# Patient Record
Sex: FEMALE | Race: WHITE | NOT HISPANIC OR LATINO | URBAN - METROPOLITAN AREA
[De-identification: names, ages, dates, MRNs, and addresses within clinical notes are randomized per-mention and may not be internally consistent; named-entity substitution may affect disease eponyms.]

---

## 2017-09-26 ENCOUNTER — OUTPATIENT (OUTPATIENT)
Dept: OUTPATIENT SERVICES | Facility: HOSPITAL | Age: 82
LOS: 1 days | Discharge: HOME | End: 2017-09-26

## 2017-09-29 DIAGNOSIS — M19.90 UNSPECIFIED OSTEOARTHRITIS, UNSPECIFIED SITE: ICD-10-CM

## 2017-09-29 DIAGNOSIS — E78.5 HYPERLIPIDEMIA, UNSPECIFIED: ICD-10-CM

## 2017-09-29 DIAGNOSIS — I10 ESSENTIAL (PRIMARY) HYPERTENSION: ICD-10-CM

## 2017-09-29 DIAGNOSIS — Z79.82 LONG TERM (CURRENT) USE OF ASPIRIN: ICD-10-CM

## 2017-09-29 DIAGNOSIS — H25.11 AGE-RELATED NUCLEAR CATARACT, RIGHT EYE: ICD-10-CM

## 2017-09-29 DIAGNOSIS — Z86.73 PERSONAL HISTORY OF TRANSIENT ISCHEMIC ATTACK (TIA), AND CEREBRAL INFARCTION WITHOUT RESIDUAL DEFICITS: ICD-10-CM

## 2017-10-10 ENCOUNTER — OUTPATIENT (OUTPATIENT)
Dept: OUTPATIENT SERVICES | Facility: HOSPITAL | Age: 82
LOS: 1 days | Discharge: HOME | End: 2017-10-10

## 2017-10-12 DIAGNOSIS — Z86.73 PERSONAL HISTORY OF TRANSIENT ISCHEMIC ATTACK (TIA), AND CEREBRAL INFARCTION WITHOUT RESIDUAL DEFICITS: ICD-10-CM

## 2017-10-12 DIAGNOSIS — H25.12 AGE-RELATED NUCLEAR CATARACT, LEFT EYE: ICD-10-CM

## 2017-10-12 DIAGNOSIS — Z79.82 LONG TERM (CURRENT) USE OF ASPIRIN: ICD-10-CM

## 2017-10-12 DIAGNOSIS — E78.5 HYPERLIPIDEMIA, UNSPECIFIED: ICD-10-CM

## 2017-10-12 DIAGNOSIS — I10 ESSENTIAL (PRIMARY) HYPERTENSION: ICD-10-CM

## 2018-05-17 ENCOUNTER — APPOINTMENT (OUTPATIENT)
Dept: ORTHOPEDIC SURGERY | Facility: CLINIC | Age: 83
End: 2018-05-17
Payer: MEDICARE

## 2018-05-17 VITALS
HEART RATE: 41 BPM | HEIGHT: 62 IN | SYSTOLIC BLOOD PRESSURE: 176 MMHG | DIASTOLIC BLOOD PRESSURE: 74 MMHG | BODY MASS INDEX: 19.69 KG/M2 | WEIGHT: 107 LBS

## 2018-05-17 VITALS — BODY MASS INDEX: 19.69 KG/M2 | WEIGHT: 107 LBS | HEIGHT: 62 IN

## 2018-05-17 DIAGNOSIS — Z87.39 PERSONAL HISTORY OF OTHER DISEASES OF THE MUSCULOSKELETAL SYSTEM AND CONNECTIVE TISSUE: ICD-10-CM

## 2018-05-17 DIAGNOSIS — Z78.9 OTHER SPECIFIED HEALTH STATUS: ICD-10-CM

## 2018-05-17 DIAGNOSIS — Z86.39 PERSONAL HISTORY OF OTHER ENDOCRINE, NUTRITIONAL AND METABOLIC DISEASE: ICD-10-CM

## 2018-05-17 DIAGNOSIS — Z86.79 PERSONAL HISTORY OF OTHER DISEASES OF THE CIRCULATORY SYSTEM: ICD-10-CM

## 2018-05-17 PROCEDURE — 26725 TREAT FINGER FRACTURE EACH: CPT | Mod: F9

## 2018-05-17 PROCEDURE — 73130 X-RAY EXAM OF HAND: CPT | Mod: RT

## 2018-05-17 PROCEDURE — 99203 OFFICE O/P NEW LOW 30 MIN: CPT | Mod: 57

## 2018-05-17 RX ORDER — OSELTAMIVIR PHOSPHATE 75 MG/1
75 CAPSULE ORAL
Qty: 10 | Refills: 0 | Status: DISCONTINUED | COMMUNITY
Start: 2017-11-28

## 2018-05-17 RX ORDER — PEN NEEDLE, DIABETIC 29 G X1/2"
32G X 4 MM NEEDLE, DISPOSABLE MISCELLANEOUS
Qty: 100 | Refills: 0 | Status: DISCONTINUED | COMMUNITY
Start: 2017-08-01

## 2018-05-17 RX ORDER — DILTIAZEM HYDROCHLORIDE 120 MG/1
120 CAPSULE, EXTENDED RELEASE ORAL
Qty: 90 | Refills: 0 | Status: ACTIVE | COMMUNITY
Start: 2017-10-12

## 2018-05-17 RX ORDER — TERIPARATIDE 250 UG/ML
600 INJECTION, SOLUTION SUBCUTANEOUS
Qty: 2 | Refills: 0 | Status: DISCONTINUED | COMMUNITY
Start: 2017-08-01

## 2018-05-17 RX ORDER — MULTIVITAMIN
TABLET ORAL
Refills: 0 | Status: DISCONTINUED | COMMUNITY

## 2018-05-17 RX ORDER — DIAZEPAM 10 MG/1
10 TABLET ORAL
Qty: 60 | Refills: 0 | Status: ACTIVE | COMMUNITY
Start: 2017-11-28

## 2018-05-17 RX ORDER — AMOXICILLIN 500 MG/1
500 CAPSULE ORAL
Qty: 36 | Refills: 0 | Status: DISCONTINUED | COMMUNITY
Start: 2017-12-04

## 2018-05-17 RX ORDER — PREDNISOLONE ACETATE 10 MG/ML
1 SUSPENSION/ DROPS OPHTHALMIC
Qty: 5 | Refills: 0 | Status: ACTIVE | COMMUNITY
Start: 2017-11-15

## 2018-05-17 RX ORDER — AZITHROMYCIN 250 MG/1
250 TABLET, FILM COATED ORAL
Qty: 6 | Refills: 0 | Status: DISCONTINUED | COMMUNITY
Start: 2017-11-02

## 2018-05-17 RX ORDER — CHROMIUM 200 MCG
TABLET ORAL
Refills: 0 | Status: ACTIVE | COMMUNITY

## 2018-05-19 PROBLEM — Z78.9 CURRENT NON-SMOKER: Status: ACTIVE | Noted: 2018-05-17

## 2018-05-19 PROBLEM — Z87.39 HISTORY OF OSTEOPOROSIS: Status: RESOLVED | Noted: 2018-05-17 | Resolved: 2018-05-19

## 2018-05-19 PROBLEM — Z86.39 HISTORY OF HIGH CHOLESTEROL: Status: RESOLVED | Noted: 2018-05-17 | Resolved: 2018-05-19

## 2018-05-19 PROBLEM — Z86.79 HISTORY OF HYPERTENSION: Status: RESOLVED | Noted: 2018-05-17 | Resolved: 2018-05-19

## 2018-05-21 ENCOUNTER — APPOINTMENT (OUTPATIENT)
Dept: ORTHOPEDIC SURGERY | Facility: CLINIC | Age: 83
End: 2018-05-21
Payer: MEDICARE

## 2018-05-21 PROCEDURE — 99024 POSTOP FOLLOW-UP VISIT: CPT

## 2018-06-12 ENCOUNTER — APPOINTMENT (OUTPATIENT)
Dept: ORTHOPEDIC SURGERY | Facility: CLINIC | Age: 83
End: 2018-06-12
Payer: MEDICARE

## 2018-06-12 DIAGNOSIS — S62.616D DISPLACED FRACTURE OF PROXIMAL PHALANX OF RIGHT LITTLE FINGER, SUBSEQUENT ENCOUNTER FOR FRACTURE WITH ROUTINE HEALING: ICD-10-CM

## 2018-06-12 PROCEDURE — 99024 POSTOP FOLLOW-UP VISIT: CPT

## 2018-06-12 PROCEDURE — 73130 X-RAY EXAM OF HAND: CPT | Mod: RT

## 2019-03-04 ENCOUNTER — TRANSCRIPTION ENCOUNTER (OUTPATIENT)
Age: 84
End: 2019-03-04

## 2024-11-19 ENCOUNTER — APPOINTMENT (OUTPATIENT)
Dept: ORTHOPEDIC SURGERY | Facility: CLINIC | Age: 89
End: 2024-11-19
Payer: MEDICARE

## 2024-11-19 VITALS — HEART RATE: 84 BPM | HEIGHT: 62 IN

## 2024-11-19 DIAGNOSIS — M19.031 PRIMARY OSTEOARTHRITIS, RIGHT WRIST: ICD-10-CM

## 2024-11-19 DIAGNOSIS — M19.041 PRIMARY OSTEOARTHRITIS, RIGHT HAND: ICD-10-CM

## 2024-11-19 DIAGNOSIS — M19.032 PRIMARY OSTEOARTHRITIS, LEFT WRIST: ICD-10-CM

## 2024-11-19 DIAGNOSIS — M65.341 TRIGGER FINGER, RIGHT RING FINGER: ICD-10-CM

## 2024-11-19 DIAGNOSIS — M19.042 PRIMARY OSTEOARTHRITIS, LEFT HAND: ICD-10-CM

## 2024-11-19 PROCEDURE — 99203 OFFICE O/P NEW LOW 30 MIN: CPT | Mod: 25

## 2024-11-19 PROCEDURE — 20550 NJX 1 TENDON SHEATH/LIGAMENT: CPT | Mod: RT

## 2024-11-19 PROCEDURE — 73110 X-RAY EXAM OF WRIST: CPT | Mod: 50

## 2024-11-20 PROBLEM — M19.041 PRIMARY OSTEOARTHRITIS OF RIGHT HAND: Status: ACTIVE | Noted: 2024-11-19

## 2024-11-20 PROBLEM — M19.031 PRIMARY OSTEOARTHRITIS OF RIGHT WRIST: Status: ACTIVE | Noted: 2024-11-20

## 2024-11-20 PROBLEM — M19.032 LOCALIZED PRIMARY OSTEOARTHRITIS OF LEFT WRIST: Status: ACTIVE | Noted: 2024-11-20

## 2025-03-31 ENCOUNTER — APPOINTMENT (OUTPATIENT)
Dept: NEPHROLOGY | Facility: CLINIC | Age: 89
End: 2025-03-31

## 2025-05-04 ENCOUNTER — INPATIENT (INPATIENT)
Facility: HOSPITAL | Age: 89
LOS: 0 days | Discharge: OTHER INSTITUTION | DRG: 310 | End: 2025-05-04
Attending: STUDENT IN AN ORGANIZED HEALTH CARE EDUCATION/TRAINING PROGRAM | Admitting: STUDENT IN AN ORGANIZED HEALTH CARE EDUCATION/TRAINING PROGRAM
Payer: MEDICARE

## 2025-05-04 VITALS
SYSTOLIC BLOOD PRESSURE: 92 MMHG | DIASTOLIC BLOOD PRESSURE: 69 MMHG | RESPIRATION RATE: 35 BRPM | HEART RATE: 137 BPM | TEMPERATURE: 98 F

## 2025-05-04 VITALS
TEMPERATURE: 98 F | DIASTOLIC BLOOD PRESSURE: 73 MMHG | HEART RATE: 68 BPM | SYSTOLIC BLOOD PRESSURE: 184 MMHG | OXYGEN SATURATION: 96 % | RESPIRATION RATE: 20 BRPM

## 2025-05-04 DIAGNOSIS — Z96.643 PRESENCE OF ARTIFICIAL HIP JOINT, BILATERAL: Chronic | ICD-10-CM

## 2025-05-04 DIAGNOSIS — R91.8 OTHER NONSPECIFIC ABNORMAL FINDING OF LUNG FIELD: ICD-10-CM

## 2025-05-04 DIAGNOSIS — I48.91 UNSPECIFIED ATRIAL FIBRILLATION: ICD-10-CM

## 2025-05-04 DIAGNOSIS — I42.1 OBSTRUCTIVE HYPERTROPHIC CARDIOMYOPATHY: ICD-10-CM

## 2025-05-04 DIAGNOSIS — E87.1 HYPO-OSMOLALITY AND HYPONATREMIA: ICD-10-CM

## 2025-05-04 DIAGNOSIS — Z29.9 ENCOUNTER FOR PROPHYLACTIC MEASURES, UNSPECIFIED: ICD-10-CM

## 2025-05-04 DIAGNOSIS — I50.9 HEART FAILURE, UNSPECIFIED: ICD-10-CM

## 2025-05-04 DIAGNOSIS — I10 ESSENTIAL (PRIMARY) HYPERTENSION: ICD-10-CM

## 2025-05-04 LAB
ALBUMIN SERPL ELPH-MCNC: 3.3 G/DL — LOW (ref 3.5–5)
ALP SERPL-CCNC: 60 U/L — SIGNIFICANT CHANGE UP (ref 40–120)
ALT FLD-CCNC: 49 U/L DA — SIGNIFICANT CHANGE UP (ref 10–60)
ANION GAP SERPL CALC-SCNC: 5 MMOL/L — SIGNIFICANT CHANGE UP (ref 5–17)
APTT BLD: 26.8 SEC — SIGNIFICANT CHANGE UP (ref 26.1–36.8)
AST SERPL-CCNC: 68 U/L — HIGH (ref 10–40)
BASOPHILS # BLD AUTO: 0.05 K/UL — SIGNIFICANT CHANGE UP (ref 0–0.2)
BASOPHILS NFR BLD AUTO: 0.6 % — SIGNIFICANT CHANGE UP (ref 0–2)
BILIRUB SERPL-MCNC: 0.3 MG/DL — SIGNIFICANT CHANGE UP (ref 0.2–1.2)
BUN SERPL-MCNC: 82 MG/DL — HIGH (ref 7–18)
CALCIUM SERPL-MCNC: 9.6 MG/DL — SIGNIFICANT CHANGE UP (ref 8.4–10.5)
CHLORIDE SERPL-SCNC: 103 MMOL/L — SIGNIFICANT CHANGE UP (ref 96–108)
CO2 SERPL-SCNC: 29 MMOL/L — SIGNIFICANT CHANGE UP (ref 22–31)
CREAT SERPL-MCNC: 0.77 MG/DL — SIGNIFICANT CHANGE UP (ref 0.5–1.3)
D DIMER BLD IA.RAPID-MCNC: 604 NG/ML DDU — HIGH
EGFR: 72 ML/MIN/1.73M2 — SIGNIFICANT CHANGE UP
EGFR: 72 ML/MIN/1.73M2 — SIGNIFICANT CHANGE UP
EOSINOPHIL # BLD AUTO: 0.31 K/UL — SIGNIFICANT CHANGE UP (ref 0–0.5)
EOSINOPHIL NFR BLD AUTO: 3.6 % — SIGNIFICANT CHANGE UP (ref 0–6)
FLUAV AG NPH QL: SIGNIFICANT CHANGE UP
FLUBV AG NPH QL: SIGNIFICANT CHANGE UP
GLUCOSE SERPL-MCNC: 119 MG/DL — HIGH (ref 70–99)
HCT VFR BLD CALC: 34.2 % — LOW (ref 34.5–45)
HGB BLD-MCNC: 11 G/DL — LOW (ref 11.5–15.5)
IMM GRANULOCYTES NFR BLD AUTO: 0.5 % — SIGNIFICANT CHANGE UP (ref 0–0.9)
INR BLD: 0.91 RATIO — SIGNIFICANT CHANGE UP (ref 0.85–1.16)
LYMPHOCYTES # BLD AUTO: 1.41 K/UL — SIGNIFICANT CHANGE UP (ref 1–3.3)
LYMPHOCYTES # BLD AUTO: 16.2 % — SIGNIFICANT CHANGE UP (ref 13–44)
MAGNESIUM SERPL-MCNC: 2 MG/DL — SIGNIFICANT CHANGE UP (ref 1.6–2.6)
MCHC RBC-ENTMCNC: 31.6 PG — SIGNIFICANT CHANGE UP (ref 27–34)
MCHC RBC-ENTMCNC: 32.2 G/DL — SIGNIFICANT CHANGE UP (ref 32–36)
MCV RBC AUTO: 98.3 FL — SIGNIFICANT CHANGE UP (ref 80–100)
MONOCYTES # BLD AUTO: 1.02 K/UL — HIGH (ref 0–0.9)
MONOCYTES NFR BLD AUTO: 11.7 % — SIGNIFICANT CHANGE UP (ref 2–14)
NEUTROPHILS # BLD AUTO: 5.9 K/UL — SIGNIFICANT CHANGE UP (ref 1.8–7.4)
NEUTROPHILS NFR BLD AUTO: 67.4 % — SIGNIFICANT CHANGE UP (ref 43–77)
NRBC BLD AUTO-RTO: 0 /100 WBCS — SIGNIFICANT CHANGE UP (ref 0–0)
NT-PROBNP SERPL-SCNC: 1289 PG/ML — HIGH (ref 0–450)
PLATELET # BLD AUTO: 321 K/UL — SIGNIFICANT CHANGE UP (ref 150–400)
POTASSIUM SERPL-MCNC: 4.5 MMOL/L — SIGNIFICANT CHANGE UP (ref 3.5–5.3)
POTASSIUM SERPL-SCNC: 4.5 MMOL/L — SIGNIFICANT CHANGE UP (ref 3.5–5.3)
PROT SERPL-MCNC: 6.4 G/DL — SIGNIFICANT CHANGE UP (ref 6–8.3)
PROTHROM AB SERPL-ACNC: 10.6 SEC — SIGNIFICANT CHANGE UP (ref 9.9–13.4)
RBC # BLD: 3.48 M/UL — LOW (ref 3.8–5.2)
RBC # FLD: 14 % — SIGNIFICANT CHANGE UP (ref 10.3–14.5)
RSV RNA NPH QL NAA+NON-PROBE: SIGNIFICANT CHANGE UP
SARS-COV-2 RNA SPEC QL NAA+PROBE: SIGNIFICANT CHANGE UP
SODIUM SERPL-SCNC: 137 MMOL/L — SIGNIFICANT CHANGE UP (ref 135–145)
SOURCE RESPIRATORY: SIGNIFICANT CHANGE UP
TROPONIN I, HIGH SENSITIVITY RESULT: 1006.1 NG/L — HIGH
TROPONIN I, HIGH SENSITIVITY RESULT: 1120.5 NG/L — HIGH
TROPONIN I, HIGH SENSITIVITY RESULT: 19.4 NG/L — SIGNIFICANT CHANGE UP
TROPONIN I, HIGH SENSITIVITY RESULT: 651.6 NG/L — HIGH
WBC # BLD: 8.73 K/UL — SIGNIFICANT CHANGE UP (ref 3.8–10.5)
WBC # FLD AUTO: 8.73 K/UL — SIGNIFICANT CHANGE UP (ref 3.8–10.5)

## 2025-05-04 PROCEDURE — 99291 CRITICAL CARE FIRST HOUR: CPT

## 2025-05-04 PROCEDURE — 99223 1ST HOSP IP/OBS HIGH 75: CPT | Mod: GC

## 2025-05-04 PROCEDURE — 71045 X-RAY EXAM CHEST 1 VIEW: CPT | Mod: 26

## 2025-05-04 PROCEDURE — 99223 1ST HOSP IP/OBS HIGH 75: CPT

## 2025-05-04 PROCEDURE — 93010 ELECTROCARDIOGRAM REPORT: CPT

## 2025-05-04 PROCEDURE — 93306 TTE W/DOPPLER COMPLETE: CPT | Mod: 26

## 2025-05-04 RX ORDER — B1/B2/B3/B5/B6/B12/VIT C/FOLIC 500-0.5 MG
1 TABLET ORAL DAILY
Refills: 0 | Status: DISCONTINUED | OUTPATIENT
Start: 2025-05-04 | End: 2025-05-04

## 2025-05-04 RX ORDER — DILTIAZEM HYDROCHLORIDE 120 MG/1
10 CAPSULE, EXTENDED RELEASE ORAL ONCE
Refills: 0 | Status: COMPLETED | OUTPATIENT
Start: 2025-05-04 | End: 2025-05-04

## 2025-05-04 RX ORDER — ASPIRIN 325 MG
81 TABLET ORAL ONCE
Refills: 0 | Status: COMPLETED | OUTPATIENT
Start: 2025-05-04 | End: 2025-05-04

## 2025-05-04 RX ORDER — ENOXAPARIN SODIUM 100 MG/ML
45 INJECTION SUBCUTANEOUS EVERY 12 HOURS
Refills: 0 | Status: DISCONTINUED | OUTPATIENT
Start: 2025-05-04 | End: 2025-05-04

## 2025-05-04 RX ORDER — MELATONIN 5 MG
3 TABLET ORAL AT BEDTIME
Refills: 0 | Status: DISCONTINUED | OUTPATIENT
Start: 2025-05-04 | End: 2025-05-04

## 2025-05-04 RX ORDER — DILTIAZEM HYDROCHLORIDE 120 MG/1
10 CAPSULE, EXTENDED RELEASE ORAL ONCE
Refills: 0 | Status: DISCONTINUED | OUTPATIENT
Start: 2025-05-04 | End: 2025-05-04

## 2025-05-04 RX ORDER — ASPIRIN 325 MG
1 TABLET ORAL
Refills: 0 | DISCHARGE

## 2025-05-04 RX ORDER — MAVACAMTEN 10 MG/1
1 CAPSULE, GELATIN COATED ORAL
Refills: 0 | DISCHARGE

## 2025-05-04 RX ORDER — MELATONIN 5 MG
1 TABLET ORAL
Refills: 0 | DISCHARGE

## 2025-05-04 RX ORDER — BISOPROLOL FUMARATE 5 MG/1
1 TABLET, FILM COATED ORAL
Refills: 0 | DISCHARGE

## 2025-05-04 RX ORDER — ASPIRIN 325 MG
81 TABLET ORAL DAILY
Refills: 0 | Status: DISCONTINUED | OUTPATIENT
Start: 2025-05-04 | End: 2025-05-04

## 2025-05-04 RX ORDER — B1/B2/B3/B5/B6/B12/VIT C/FOLIC 500-0.5 MG
1 TABLET ORAL ONCE
Refills: 0 | Status: COMPLETED | OUTPATIENT
Start: 2025-05-04 | End: 2025-05-04

## 2025-05-04 RX ORDER — EPLERENONE 25 MG/1
1 TABLET ORAL
Refills: 0 | DISCHARGE

## 2025-05-04 RX ORDER — ENOXAPARIN SODIUM 100 MG/ML
45 INJECTION SUBCUTANEOUS ONCE
Refills: 0 | Status: COMPLETED | OUTPATIENT
Start: 2025-05-04 | End: 2025-05-04

## 2025-05-04 RX ORDER — DILTIAZEM HYDROCHLORIDE 120 MG/1
30 CAPSULE, EXTENDED RELEASE ORAL ONCE
Refills: 0 | Status: COMPLETED | OUTPATIENT
Start: 2025-05-04 | End: 2025-05-04

## 2025-05-04 RX ORDER — ONDANSETRON HCL/PF 4 MG/2 ML
4 VIAL (ML) INJECTION EVERY 8 HOURS
Refills: 0 | Status: DISCONTINUED | OUTPATIENT
Start: 2025-05-04 | End: 2025-05-04

## 2025-05-04 RX ORDER — POLYETHYLENE GLYCOL 3350 17 G/17G
17 POWDER, FOR SOLUTION ORAL
Refills: 0 | DISCHARGE

## 2025-05-04 RX ORDER — METOPROLOL SUCCINATE 50 MG/1
100 TABLET, EXTENDED RELEASE ORAL
Refills: 0 | Status: DISCONTINUED | OUTPATIENT
Start: 2025-05-04 | End: 2025-05-04

## 2025-05-04 RX ORDER — UREA 40 G
15 VIAL (EA) INTRAVENOUS
Refills: 0 | DISCHARGE

## 2025-05-04 RX ORDER — ACETAMINOPHEN 500 MG/5ML
650 LIQUID (ML) ORAL EVERY 6 HOURS
Refills: 0 | Status: DISCONTINUED | OUTPATIENT
Start: 2025-05-04 | End: 2025-05-04

## 2025-05-04 RX ORDER — B1/B2/B3/B5/B6/B12/VIT C/FOLIC 500-0.5 MG
1 TABLET ORAL
Refills: 0 | DISCHARGE

## 2025-05-04 RX ADMIN — Medication 1 TABLET(S): at 18:40

## 2025-05-04 RX ADMIN — DILTIAZEM HYDROCHLORIDE 30 MILLIGRAM(S): 120 CAPSULE, EXTENDED RELEASE ORAL at 05:23

## 2025-05-04 RX ADMIN — DILTIAZEM HYDROCHLORIDE 10 MILLIGRAM(S): 120 CAPSULE, EXTENDED RELEASE ORAL at 04:19

## 2025-05-04 RX ADMIN — Medication 0.1 MILLIGRAM(S): at 21:08

## 2025-05-04 RX ADMIN — Medication 1000 MILLILITER(S): at 04:37

## 2025-05-04 RX ADMIN — DILTIAZEM HYDROCHLORIDE 10 MILLIGRAM(S): 120 CAPSULE, EXTENDED RELEASE ORAL at 06:22

## 2025-05-04 RX ADMIN — ENOXAPARIN SODIUM 45 MILLIGRAM(S): 100 INJECTION SUBCUTANEOUS at 16:16

## 2025-05-04 RX ADMIN — METOPROLOL SUCCINATE 100 MILLIGRAM(S): 50 TABLET, EXTENDED RELEASE ORAL at 18:40

## 2025-05-04 RX ADMIN — Medication 81 MILLIGRAM(S): at 18:40

## 2025-05-04 NOTE — ED PROVIDER NOTE - PROGRESS NOTE DETAILS
After initial dose of Cardizem heart rate improved 105 110s however blood pressure dropped to 90 systolic.  Patient given fluids.  Repeat blood pressure now 110 systolic heart rate 120s to 130s.  Will give additional dose of Cardizem.  Patient to be admitted.

## 2025-05-04 NOTE — H&P ADULT - PROBLEM SELECTOR PLAN 4
has h/o resistant HTN, on clonidine 0.1mg BID, eplerenone 50 one ca day, Bisoprolol 10  BP on softer side after cardizem in ed  -holding clonidine and eplerenone  -started metoprolol 100 bid with holding parameters (therapeutic interchange)  -monitor BP

## 2025-05-04 NOTE — ED ADULT NURSE NOTE - NSFALLHARMRISKINTERV_ED_ALL_ED
Assistance OOB with selected safe patient handling equipment if applicable/Assistance with ambulation/Communicate risk of Fall with Harm to all staff, patient, and family/Monitor gait and stability/Provide visual cue: red socks, yellow wristband, yellow gown, etc/Reinforce activity limits and safety measures with patient and family/Bed in lowest position, wheels locked, appropriate side rails in place/Call bell, personal items and telephone in reach/Instruct patient to call for assistance before getting out of bed/chair/stretcher/Non-slip footwear applied when patient is off stretcher/Taylor to call system/Physically safe environment - no spills, clutter or unnecessary equipment/Purposeful Proactive Rounding/Room/bathroom lighting operational, light cord in reach

## 2025-05-04 NOTE — DISCHARGE NOTE PROVIDER - NSDCCPCAREPLAN_GEN_ALL_CORE_FT
PRINCIPAL DISCHARGE DIAGNOSIS  Diagnosis: Atrial fibrillation  Assessment and Plan of Treatment: You presented to ED with shortness of breath, chest pain and lower extremity swelling. Found to have HR of 150-160s. Was given cardizem, which controlled you heart rate. Given lovenox for anticoagulation. started Metoprolol for beta blockers. Atrial fibrillation could be side effect of medication yopu are taking, Mavacamten. You are getting transferred to [facility] for continuity of care.      SECONDARY DISCHARGE DIAGNOSES  Diagnosis: HOCM (hypertrophic obstructive cardiomyopathy)  Assessment and Plan of Treatment:     Diagnosis: HTN (hypertension)  Assessment and Plan of Treatment:     Diagnosis: Hyponatremia  Assessment and Plan of Treatment:     Diagnosis: Lung nodules  Assessment and Plan of Treatment:

## 2025-05-04 NOTE — DISCHARGE NOTE PROVIDER - ATTENDING DISCHARGE PHYSICAL EXAMINATION:
No dsitress, heart rate regular, speaking complete sentences, no obvious lower ext edema  - patient accepted at [facility] for continuity of care regarding following of her hypertrophic obstructive cardiomyopathy

## 2025-05-04 NOTE — DISCHARGE NOTE PROVIDER - HOSPITAL COURSE
89y/F, from home, independent, PMH of HOCM w JUDITH/ severe LVOT/ Severe MR, HTN, TIA (2015), multiple lung nodules, was brought in by family for acute onset left sided chest pain with SOB and b/l lower leg swelling. In ED, -160. EKG: Afib with RVR. Trop negative X1. s/p cardizem 30 po X1, Cardizem 10 IVP X2 in ED, tele: sinus rhythm at the moment. Admitted for Afib with RVR. CHADSVASc 6; was given lovenox 45 for AC. On Bisoprolol 10 at home, started metoprolol tart 100 BID with holding parameters (therapeutic interchange). Repeat Troponin 651, Repeat EKG: NSR, no ischemic changes. Denies chest pain, palpitation, SOB at the moment. No signs and symptoms of infection.  As per son, patient is on Mavacamten for HOCM, follows up with Dr Jesus from [facility]. States he is aware that the side effect of this med is Afib and heart failure. Given the complexity of patient medical condition, patient is getting transferred to [facility] for continuity of care. Accepting physician is Dr Song.    89y/F, from home, independent, PMH of HOCM w JUDITH/ severe LVOT/ Severe MR, HTN, TIA (2015), multiple lung nodules, was brought in by family for acute onset left sided chest pain with SOB and b/l lower leg swelling. In ED, -160. EKG: Afib with RVR. Trop negative X1. s/p cardizem 30 po X1, Cardizem 10 IVP X2 in ED, tele: sinus rhythm at the moment. Admitted for Afib with RVR. CHADSVASc 6; was given lovenox 45 for AC. On Bisoprolol 10 at home, started metoprolol tart 100 BID with holding parameters (therapeutic interchange). Repeat Troponin 651, Repeat EKG: NSR, no ischemic changes. Denies chest pain, palpitation, SOB at the moment. No signs and symptoms of infection. Outpt recors shows, TSH 0.9  As per son, patient is on Mavacamten for HOCM, follows up with Dr Jesus from [facility]. States he is aware that the side effect of this med is Afib and heart failure. Given the complexity of patient medical condition, patient is getting transferred to [facility] for continuity of care. Accepting physician is Dr Song.

## 2025-05-04 NOTE — PATIENT PROFILE ADULT - FUNCTIONAL ASSESSMENT - BASIC MOBILITY 6.
3-calculated by average /Not able to assess (calculate score using [facility] averaging method)

## 2025-05-04 NOTE — PATIENT PROFILE ADULT - FALL HARM RISK - RISK INTERVENTIONS

## 2025-05-04 NOTE — DISCHARGE NOTE NURSING/CASE MANAGEMENT/SOCIAL WORK - FINANCIAL ASSISTANCE
[facility] provides services at a reduced cost to those who are determined to be eligible through [facility]’s financial assistance program. Information regarding [facility]’s financial assistance program can be found by going to [email or web address] or by calling [phone].

## 2025-05-04 NOTE — H&P ADULT - HISTORY OF PRESENT ILLNESS
89y/F, from home, independent, [facility] of HOCM w JUDITH/ severe LVOT/ Severe MR, HTN, TIA (2015), multiple lung nodules, was brought in by family for acute onset left sided chest pain with SOB and b/l lower leg swelling. In ED, -160. EKG: Afib with RVR. Trop negative X1. s/p cardizem 30 po X1, Cardizem 10 IVP X2 in ED, tele: sinus rhythm at the moment. Admitted for Afib with RVR. CHADSVASc 6; was given lovenox 45 for AC. On Bisoprolol 10 at home, started metoprolol tart 100 BID with holding parameters (therapeutic interchange). Repeat Troponin 651, Repeat EKG: NSR, no ischemic changes. Denies chest pain, palpitation, SOB at the moment. No signs and symptoms of infection. Outpt recors shows, TSH 0.9 89y/F, from home, independent, PMH of HOCM w JUDITH/ severe LVOT/ Severe MR, HTN, TIA (2015), multiple lung nodules, was brought in by family for acute onset left sided chest pain with SOB and b/l lower leg swelling. As per son, patient f/u  Anthonyalfredo from [facility], was started on Mavacamten on march for HOCM, and the side effect of that medication is either Afib or HF. Son brought her to the nearest hospital. Denies fever and chills,      In ED, -160. EKG: Afib with RVR. Trop negative X1. s/p cardizem 30 po X1, Cardizem 10 IVP X2 in ED, tele: sinus rhythm at the moment. Admitted for Afib with RVR. CHADSVASc 6; was given lovenox 45 for AC. On Bisoprolol 10 at home, started metoprolol tart 100 BID with holding parameters (therapeutic interchange). Repeat Troponin 651, Repeat EKG: NSR, no ischemic changes. Denies chest pain, palpitation, SOB at the moment. No signs and symptoms of infection. Outpt recors shows, TSH 0.9 89y/F, from home, independent, PMH of HOCM w JUDITH/ severe LVOT/ Severe MR, HTN, TIA (2015), multiple lung nodules, was brought in by family for acute onset left sided chest pain with SOB and b/l lower leg swelling. As per son, patient f/u Dr Jesus from [facility], was started on Mavacamten on march for HOCM, and the side effect of that medication is either Afib or HF. Patient get ECHO every 4 weeks, last one on 4/22. Son brought her to the nearest hospital. Denies fever and chills, abdominal pain, N/V, urinary or bowel symptoms.     In ED, -160. EKG: Afib with RVR. Trop negative X1. s/p cardizem 30 po X1, Cardizem 10 IVP X2 in ED, tele: sinus rhythm at the moment.   Repeat Troponin 651, Repeat EKG: NSR, no ischemic changes  Patient's son requesting transfer to [facility] for continuity of care.

## 2025-05-04 NOTE — ED ADULT NURSE NOTE - ED STAT RN HANDOFF DETAILS
Pt  A&Ox3 and on cardiac monitor and is NSR. Denies chest pain and SOB. Pt awaiting to be transferred to [facility].  Endorsed to MANUELITO Seo

## 2025-05-04 NOTE — H&P ADULT - ATTENDING COMMENTS
Carolin is a 89 year old female, hx of HOCM on mavacatem, severe MR, HTN, TIA, lung nodule, who presented due to chest pain, sob and lower ext swelling.  Found with afib with RVR in the ED, given diltiazem 10mg x 2, she was hypotensive inbetween rounds and given IV fluids with improvement.. She eventually became sinus rhythm after the second dose of diltiazem.   Per patient's son, requesting transfer to [facility] for further management of new afib possible CHF.  Dr Jesus;s office called, instructed to call transfer center  Transfer center called spoke to Dr. Song, due to patient's new HOCM medication and new heart findings, patient accepted Carolin is a 89 year old female, hx of HOCM on mavacatem, severe MR, HTN, TIA, lung nodule, who presented due to chest pain, sob and lower ext swelling.  Found with afib with RVR in the ED, given diltiazem 10mg x 2, she was hypotensive inbetween rounds and given IV fluids with improvement.. She eventually became sinus rhythm after the second dose of diltiazem.   Per patient's son, requesting transfer to [facility] for further management of new afib possible CHF.  Dr Jesus;s office called, instructed to call transfer center  Transfer center called spoke to Dr. Song, due to patient's new HOCM medication, following a HOCM specialist and new heart findings, patient accepted for further evaluation at [facility].  Son, Gilmar, was called left voice mail to inform him.    Physical:   Elderly female awake alert, heart regular, abd soft nontender, minimal edema of the lower extremity. Speaking complete sentences.    89 female with hx of HOCM who presents with new afib, to be transferred to [facility] for continuity of care due to following of HOCM specialist.     #New on set Afib with RVR  #Hx of HOCM    Transfer to [facility] for continuity of care with HOCM specialist.  Discussed with in-house cardiology  Give 1 dose lovenox 45mg after discussion with transfer center  Started on metoprolol over bisoprolol  Continue to trend troponin until peak  Hemodynamically stable Student

## 2025-05-04 NOTE — H&P ADULT - PROBLEM SELECTOR PLAN 5
has h/o hyponatremia on Ure-Na twice a day at home  p/w Na 137  holding for now  monitor BMP  resume as needed

## 2025-05-04 NOTE — DISCHARGE NOTE NURSING/CASE MANAGEMENT/SOCIAL WORK - PATIENT PORTAL LINK FT
You can access the FollowMyHealth Patient Portal offered by [facility] by registering at the following website: [email or web address]. By joining [employer]’s FollowMyHealth portal, you will also be able to view your health information using other applications (apps) compatible with our system.

## 2025-05-04 NOTE — ED ADULT NURSE NOTE - ED STAT RN HANDOFF DETAILS 2
Patient A&Ox4 admitted to telemetry for Vital signs stable as documented, IV intact, no redness or swelling noted. Normal sinus rhythm noted on tele monitor, HR Endorsed to Faina OWUSU. Pending transfer to [facility]

## 2025-05-04 NOTE — ED PROVIDER NOTE - OBJECTIVE STATEMENT
89-year-old female history of hyponatremia, hypertension, cardiomyopathy presents ED with complaint of chest pain and shortness of breath since yesterday.  Patient tachycardic to 140s in triage.  No known history of A-fib as per patient and son at bedside.

## 2025-05-04 NOTE — CONSULT NOTE ADULT - SUBJECTIVE AND OBJECTIVE BOX
CHIEF COMPLAINT: Shortness of breath    HPI: 89 yo F with HTN, HLD, and HCM on mavacamten who presented with dyspnea, noted to be in new-onset A fib with RVR. In the ER patient received diltiazem and subsequently converted to NSR, she was also given full-dose lovenox. Patient reports     PAST MEDICAL & SURGICAL HISTORY:  As above, also Lung nodule  H/O bilateral hip replacements    Allergies    No Known Allergies    MEDICATIONS  (STANDING):  aspirin enteric coated 81 milliGRAM(s) Oral daily  enoxaparin Injectable 45 milliGRAM(s) SubCutaneous every 12 hours  metoprolol tartrate 100 milliGRAM(s) Oral two times a day  multivitamin 1 Tablet(s) Oral daily    MEDICATIONS  (PRN):  acetaminophen     Tablet .. 650 milliGRAM(s) Oral every 6 hours PRN Temp greater or equal to 38C (100.4F), Mild Pain (1 - 3)  melatonin 3 milliGRAM(s) Oral at bedtime PRN Insomnia  ondansetron Injectable 4 milliGRAM(s) IV Push every 8 hours PRN Nausea and/or Vomiting      FAMILY HISTORY:    No family history of premature coronary artery disease or sudden cardiac death    SOCIAL HISTORY:  Smoking-  Alcohol-  Illicit Drug use-    REVIEW OF SYSTEMS:  Constitutional: [ ] fever, [ ]weight loss,  [ ]fatigue  Eyes: [ ] visual changes  Respiratory: [ ]shortness of breath;  [ ] cough, [ ]wheezing, [ ]chills, [ ]hemoptysis  Cardiovascular: [ ] chest pain, [ ]palpitations, [ ]dizziness,  [ ]leg swelling [ ]syncope  Gastrointestinal: [ ] abdominal pain, [ ]nausea, [ ]vomiting,  [ ]diarrhea   Genitourinary: [ ] dysuria, [ ] hematuria  Neurologic: [ ] headaches [ ] tremors  [ ] weakness [ ] lightheadedness  Skin: [ ] itching, [ ]burning, [ ] rashes  Endocrine: [ ] heat or cold intolerance  Musculoskeletal: [ ] joint pain or swelling; [ ] muscle, back, or extremity pain  Psychiatric: [ ] depression, [ ]anxiety, [ ]mood swings, or [ ]difficulty sleeping  Hematologic: [ ] easy bruising, [ ] bleeding gums       [ x] All others negative except as listed in HPI	  [ ] Unable to obtain    Vital Signs Last 24 Hrs  T(C): 36.8 (04 May 2025 11:46), Max: 36.8 (04 May 2025 11:46)  T(F): 98.3 (04 May 2025 11:46), Max: 98.3 (04 May 2025 11:46)  HR: 80 (04 May 2025 11:46) (58 - 137)  BP: 147/66 (04 May 2025 11:46) (88/48 - 147/66)  BP(mean): 88 (04 May 2025 11:46) (80 - 88)  RR: 29 (04 May 2025 11:46) (22 - 35)  SpO2: 96% (04 May 2025 11:46) (93% - 100%)    Parameters below as of 04 May 2025 11:46  Patient On (Oxygen Delivery Method): room air      I&O's Summary      PHYSICAL EXAM:  General: No acute distress  HEENT: EOMI, PERRL  Neck: Supple, No JVD  Lungs: Clear to auscultation bilaterally; No rales or wheezing  Heart: Regular rate and rhythm; No murmurs, rubs, or gallops  Abdomen: Nontender, bowel sounds present  Extremities: No clubbing, cyanosis, or edema  Nervous system:  Alert & Oriented X3, no focal deficits  Psychiatric: Normal affect  Skin: No rashes or lesions      LABS:  05-04    137  |  103  |  82[H]  ----------------------------<  119[H]  4.5   |  29  |  0.77    Ca    9.6      04 May 2025 04:13  Mg     2.0     05-04    TPro  6.4  /  Alb  3.3[L]  /  TBili  0.3  /  DBili  x   /  AST  68[H]  /  ALT  49  /  AlkPhos  60  05-04    Creatinine Trend: 0.77<--                        11.0   8.73  )-----------( 321      ( 04 May 2025 04:13 )             34.2     PT/INR - ( 04 May 2025 04:13 )   PT: 10.6 sec;   INR: 0.91 ratio         PTT - ( 04 May 2025 04:13 )  PTT:26.8 sec    Lipid Panel:      Cardiac Enzymes:   Troponin I, High Sensitivity Result: 1006.1 ng/L (05-04-25 @ 12:44)  Troponin I, High Sensitivity Result: 651.6 ng/L (05-04-25 @ 09:11)  Troponin I, High Sensitivity Result: 19.4 ng/L (05-04-25 @ 04:13)    pro-BNP:    RADIOLOGY: < from: Xray Chest 1 View- PORTABLE-Urgent (Xray Chest 1 View- PORTABLE-Urgent .) (05.04.25 @ 04:47) >  There is mild pulmonary venous congestion.    < end of copied text >    ECG [my interpretation]: 5/4/25 @ 10:14: Sinus rhythm normal axis. EKG at 03:57 with A fib @ 147 bpm    TELEMETRY:    CHIEF COMPLAINT: Shortness of breath    HPI: 89 yo F with HTN, HLD, and HCM on mavacamten who presented with dyspnea, noted to be in new-onset A fib with RVR. In the ER patient received diltiazem and subsequently converted to NSR, she was also given full-dose lovenox. Patient reports that she felt chest discomfort at home last night. No associated dyspnea, LH, or syncope. she did not feel palpitations though. She reported her symptoms immediately resolved after her 3rd dose of diltiazem, probably around the time she converted back to NSR. History supplemented by patient's son, who was present via speakerphone.     PAST MEDICAL & SURGICAL HISTORY:  As above, also Lung nodule  H/O bilateral hip replacements    Allergies    No Known Allergies    MEDICATIONS  (STANDING):  aspirin enteric coated 81 milliGRAM(s) Oral daily  enoxaparin Injectable 45 milliGRAM(s) SubCutaneous every 12 hours  metoprolol tartrate 100 milliGRAM(s) Oral two times a day  multivitamin 1 Tablet(s) Oral daily    MEDICATIONS  (PRN):  acetaminophen     Tablet .. 650 milliGRAM(s) Oral every 6 hours PRN Temp greater or equal to 38C (100.4F), Mild Pain (1 - 3)  melatonin 3 milliGRAM(s) Oral at bedtime PRN Insomnia  ondansetron Injectable 4 milliGRAM(s) IV Push every 8 hours PRN Nausea and/or Vomiting      FAMILY HISTORY:    No family history of premature coronary artery disease or sudden cardiac death    SOCIAL HISTORY:  Smoking-denies  Alcohol-denies  Illicit Drug use-denies    REVIEW OF SYSTEMS:  Constitutional: [ ] fever, [ ]weight loss,  [ ]fatigue  Eyes: [ ] visual changes  Respiratory: [ ]shortness of breath;  [ ] cough, [ ]wheezing, [ ]chills, [ ]hemoptysis  Cardiovascular: [ ] chest pain, [ ]palpitations, [ ]dizziness,  [ ]leg swelling [ ]syncope  Gastrointestinal: [ ] abdominal pain, [ ]nausea, [ ]vomiting,  [ ]diarrhea   Genitourinary: [ ] dysuria, [ ] hematuria  Neurologic: [ ] headaches [ ] tremors  [ ] weakness [ ] lightheadedness  Skin: [ ] itching, [ ]burning, [ ] rashes  Endocrine: [ ] heat or cold intolerance  Musculoskeletal: [ ] joint pain or swelling; [ ] muscle, back, or extremity pain  Psychiatric: [ ] depression, [ ]anxiety, [ ]mood swings, or [ ]difficulty sleeping  Hematologic: [ ] easy bruising, [ ] bleeding gums       [ x] All others negative except as listed in HPI	  [ ] Unable to obtain    Vital Signs Last 24 Hrs  T(C): 36.8 (04 May 2025 11:46), Max: 36.8 (04 May 2025 11:46)  T(F): 98.3 (04 May 2025 11:46), Max: 98.3 (04 May 2025 11:46)  HR: 80 (04 May 2025 11:46) (58 - 137)  BP: 147/66 (04 May 2025 11:46) (88/48 - 147/66)  BP(mean): 88 (04 May 2025 11:46) (80 - 88)  RR: 29 (04 May 2025 11:46) (22 - 35)  SpO2: 96% (04 May 2025 11:46) (93% - 100%)    Parameters below as of 04 May 2025 11:46  Patient On (Oxygen Delivery Method): room air      I&O's Summary      PHYSICAL EXAM:  General: No acute distress  HEENT: EOMI, PERRL  Neck: Supple, No JVD  Lungs: Clear to auscultation bilaterally; No rales or wheezing  Heart: Regular rate and rhythm; III/VI HSM at SB  Abdomen: Nontender, bowel sounds present  Extremities: No clubbing, cyanosis, or edema  Nervous system:  Alert & Oriented X3, no focal deficits  Psychiatric: Normal affect  Skin: No rashes or lesions      LABS:  05-04    137  |  103  |  82[H]  ----------------------------<  119[H]  4.5   |  29  |  0.77    Ca    9.6      04 May 2025 04:13  Mg     2.0     05-04    TPro  6.4  /  Alb  3.3[L]  /  TBili  0.3  /  DBili  x   /  AST  68[H]  /  ALT  49  /  AlkPhos  60  05-04    Creatinine Trend: 0.77<--                        11.0   8.73  )-----------( 321      ( 04 May 2025 04:13 )             34.2     PT/INR - ( 04 May 2025 04:13 )   PT: 10.6 sec;   INR: 0.91 ratio         PTT - ( 04 May 2025 04:13 )  PTT:26.8 sec    Lipid Panel:      Cardiac Enzymes:   Troponin I, High Sensitivity Result: 1006.1 ng/L (05-04-25 @ 12:44)  Troponin I, High Sensitivity Result: 651.6 ng/L (05-04-25 @ 09:11)  Troponin I, High Sensitivity Result: 19.4 ng/L (05-04-25 @ 04:13)    pro-BNP:    RADIOLOGY: < from: Xray Chest 1 View- PORTABLE-Urgent (Xray Chest 1 View- PORTABLE-Urgent .) (05.04.25 @ 04:47) >  There is mild pulmonary venous congestion.    < end of copied text >    ECG [my interpretation]: 5/4/25 @ 10:14: Sinus rhythm normal axis. EKG at 03:57 with A fib @ 147 bpm    TELEMETRY: Sinus rhythm, brief run of pAF at 14:17

## 2025-05-04 NOTE — H&P ADULT - PROBLEM SELECTOR PLAN 1
p/w -160; EKG: Afib with RVR  s/p cardizem PO 30X1, [id number] IVP X2 in ED  repeat EKG: sinus   CHADSVASc 6  TSH 0.9 (4/30)    - admit to telemetry  - started metorpolol 100 BID (on bisoprolol 10 at home)  - started Lovenox 45 q12  - f/u TTE  -Cardio Dr Hutchins consulted  pending transfer to [facility] for continuity of care

## 2025-05-04 NOTE — H&P ADULT - PROBLEM SELECTOR PLAN 2
p/w acute onset SOB and b/l le edema  [id number], D dimer 6.4  Trop 19.4>651.4>1006  EKG: Afib with RVR  XRay: increased pulmonary venous congestion  likely 2/2 use of Mavacamten  Troponin elevation likely 2/2 demand  -trend troponin  -avoid decreasing preload iso HOCM  -f/u TTE  pending transfer to [facility] for continuity of care

## 2025-05-04 NOTE — ED PROVIDER NOTE - NS ED ATTENDING STATEMENT MOD
no neck pain Attending Only I have personally provided the amount of critical care time documented below excluding time spent on separate procedures.

## 2025-05-04 NOTE — H&P ADULT - TIME BILLING
Discussion with patient, her son. Discussion with [facility] outpatient cardiologist, discussion with [facility] transfer center, review of external records, review of labs, interpretation of telemetry and imaging, formulation of plan, medical and medication management, doucmentation of encounter

## 2025-05-04 NOTE — H&P ADULT - ASSESSMENT
89y/F, from home, independent, PM of [facility] w JUDITH/ severe LVOT/ Severe MR, HTN, TIA (2015), multiple lung nodules, was brought in by family for acute onset left sided chest pain with SOB and b/l lower leg swelling. In eED, -160. EKG: Afib with RVR. Trop negative X1. s/p cardizem in ED, tele: sinus rhythm at the moment. Admitted for Afib with RVR.

## 2025-05-04 NOTE — H&P ADULT - PROBLEM SELECTOR PLAN 3
has h/o HOCM on Mavacamten; f/u Dr Jesus from [facility]  -holding mavacamten  -avoid decreasing preload   -avoid CCB as it has additive negative inotropic effect  -Cardio, Dr Hutchins consulted  pending transfer to [facility] for continuity of care

## 2025-05-04 NOTE — ED ADULT NURSE NOTE - OBJECTIVE STATEMENT
Patient Aox4, presents tot he ED accompanied by son who reports patient has chest pain x yesterday. Pt c/o nausea

## 2025-05-04 NOTE — H&P ADULT - NSHPPHYSICALEXAM_GEN_ALL_CORE
T(C): 36.3 (05-04-25 @ 07:35), Max: 36.6 (05-04-25 @ 04:17)  HR: 58 (05-04-25 @ 07:45) (58 - 137)  BP: 120/62 (05-04-25 @ 07:45) (88/48 - 120/62)  RR: 25 (05-04-25 @ 07:35) (25 - 35)  SpO2: 100% (05-04-25 @ 07:35) (93% - 100%)    GENERAL: NAD, speaks in full sentences, no signs of respiratory distress  HEAD:  Atraumatic, Normocephalic  EYES: EOMI, PERRLA, conjunctiva and sclera clear  NECK: Supple, No JVD  CHEST/LUNG: Clear to auscultation bilaterally; No wheeze; No crackles; No accessory muscles used  HEART: Regular rate and rhythm; No murmurs;   ABDOMEN: Soft, Nontender, Nondistended; Bowel sounds present; No guarding  EXTREMITIES:  2+ Peripheral Pulses, No cyanosis or edema  PSYCH: AAOx3  NEUROLOGY: non-focal  SKIN: No rashes or lesions T(C): 36.3 (05-04-25 @ 07:35), Max: 36.6 (05-04-25 @ 04:17)  HR: 58 (05-04-25 @ 07:45) (58 - 137)  BP: 120/62 (05-04-25 @ 07:45) (88/48 - 120/62)  RR: 25 (05-04-25 @ 07:35) (25 - 35)  SpO2: 100% (05-04-25 @ 07:35) (93% - 100%)    GENERAL: NAD, speaks in full sentences, no signs of respiratory distress  HEAD:  Atraumatic, Normocephalic  EYES: EOMI, PERRLA, conjunctiva and sclera clear  NECK: Supple, No JVD  CHEST/LUNG: B/l basilar crackles; No wheeze  HEART: Regular rate and rhythm; No murmurs;   ABDOMEN: Soft, Nontender, Nondistended; Bowel sounds present; No guarding  EXTREMITIES: B/l 1+ edema;  2+ Peripheral Pulses  PSYCH: AAOx3  NEUROLOGY: No motor or sensory deficit  SKIN: No rashes or lesions

## 2025-05-04 NOTE — H&P ADULT - NSICDXPASTMEDICALHX_GEN_ALL_CORE_FT
PAST MEDICAL HISTORY:  HLD (hyperlipidemia)     HOCM (hypertrophic obstructive cardiomyopathy)     HTN (hypertension)     Lung nodule

## 2025-05-04 NOTE — CONSULT NOTE ADULT - ASSESSMENT
89 yo F with HTN, HLD, and HCM on mavacamten who presented with dyspnea, noted to be in new-onset A fib with RVR.     1. HCM: Patient is on mavacamten  -Since she is presenting with heart failure, would hold off further doses of the medication and obtain echocardiogram. If EF < 50% the medication will need to be discontinued  -Note that non-dihydropyridine calcium channel blockers (i.e. the diltiazem that patient received in the ER) should not be used together with mavacamten due to their additive negative inotropic effects; would avoid diltiazem/verapamil for now    2. Atrial fibrillation:  CHADS2-VASc score is 4 (HTN, age+2, female), consistent with ~4% annual risk of stroke if off systemic anticoagulation (5 if EF is reduced).   -Would start on Eliquis 2.5mg PO BID (age > 80, weight < 60kg)    3. Heart Failure: May be exacerbation in setting of A fib with RVR  -Check echocardiogram to assess if systolic or diastolic HF  -Strict Is and Os, please check daily weights  -In setting of HCM, would avoid significant preload depletion as that could worsen LVOT gradient/obstruction  -Patient is pending transfer to [facility] per chart notes    4. HTN: Patient is normotensive now off medications     5. HLD: If patient is on rosuvastatin or atorvastatin at home, this can be resumed as there are no interactions with mavacamten    ***Note that this is a preliminary note and any recommendations should NOT be carried out until this note is finalized. *** 89 yo F with HTN, HLD, and HCM on mavacamten who presented with dyspnea, noted to be in new-onset A fib with RVR.     1. HCM: Patient is on mavacamten  -Since she is presenting with heart failure, would hold off further doses of the medication and obtain echocardiogram. If EF < 50% the medication will need to be discontinued  -Note that non-dihydropyridine calcium channel blockers (i.e. the diltiazem that patient received in the ER) should not be used together with mavacamten due to their additive negative inotropic effects; would avoid diltiazem/verapamil for now    2. Atrial fibrillation:  CHADS2-VASc score is 4 (HTN, age+2, female), consistent with ~4% annual risk of stroke if off systemic anticoagulation (5 if EF is reduced).   -Would start on Eliquis 2.5mg PO BID (age > 80, weight < 60kg)    3. Heart Failure: May be exacerbation in setting of A fib with RVR  -Check echocardiogram to assess if systolic or diastolic HF  -Strict Is and Os, please check daily weights  -In setting of HCM, would avoid significant preload depletion as that could worsen LVOT gradient/obstruction  -Patient is pending transfer to [facility] per chart notes    4. HTN: Patient is normotensive now off medications     5. HLD: If patient is on rosuvastatin or atorvastatin at home, this can be resumed as there are no interactions with mavacamten

## 2025-05-04 NOTE — DISCHARGE NOTE PROVIDER - NSDCMRMEDTOKEN_GEN_ALL_CORE_FT
ascorbic acid: 1,000 milligram(s) orally once a day  aspirin 81 mg oral tablet: 1 tab(s) orally once a day  bisoprolol 10 mg oral tablet: 1 tab(s) orally once a day  calcium citrate: 300 milligram(s) orally 2 times a day  cholecalciferol 50 mcg (2000 intl units) oral tablet: 1 tab(s) orally once a day  cloNIDine 0.1 mg/12 hr oral tablet, extended release: 1 tab(s) orally 2 times a day  eplerenone 50 mg oral tablet: 1 tab(s) orally once a day  mavacamten 5 mg oral capsule: 1 cap(s) orally once a day  melatonin 1 mg oral tablet: 1 tab(s) orally once a day (at bedtime)  multivitamin: 1 tab(s) orally once a day  Polyethylene Glycol 3350: 17 gram(s) orally once a day  ure-Na 15 g oral powder for reconstitution: 15 gram(s) orally 2 times a day

## 2025-05-04 NOTE — H&P ADULT - NSHPREVIEWOFSYSTEMS_GEN_ALL_CORE
- CONSTITUTIONAL: Denies fever and chills  - HEENT: Denies changes in vision and hearing.  - CV: Denies chest pain and palpitations  - GI: Denies abdominal pain, nausea, vomiting and diarrhea.  - : Denies dysuria and urinary frequency.  - SKIN: Denies rash and pruritus.  - NEUROLOGICAL: Denies headache and syncope.  - PSYCHIATRIC: Denies recent changes in mood. Denies anxiety and depression.

## 2025-06-02 PROCEDURE — 93306 TTE W/DOPPLER COMPLETE: CPT

## 2025-06-02 PROCEDURE — 96374 THER/PROPH/DIAG INJ IV PUSH: CPT

## 2025-06-02 PROCEDURE — 36415 COLL VENOUS BLD VENIPUNCTURE: CPT

## 2025-06-02 PROCEDURE — 96376 TX/PRO/DX INJ SAME DRUG ADON: CPT

## 2025-06-02 PROCEDURE — 85379 FIBRIN DEGRADATION QUANT: CPT

## 2025-06-02 PROCEDURE — 83880 ASSAY OF NATRIURETIC PEPTIDE: CPT

## 2025-06-02 PROCEDURE — 85730 THROMBOPLASTIN TIME PARTIAL: CPT

## 2025-06-02 PROCEDURE — 93005 ELECTROCARDIOGRAM TRACING: CPT

## 2025-06-02 PROCEDURE — 99285 EMERGENCY DEPT VISIT HI MDM: CPT | Mod: 25

## 2025-06-02 PROCEDURE — 71045 X-RAY EXAM CHEST 1 VIEW: CPT

## 2025-06-02 PROCEDURE — 87637 SARSCOV2&INF A&B&RSV AMP PRB: CPT

## 2025-06-02 PROCEDURE — 80053 COMPREHEN METABOLIC PANEL: CPT

## 2025-06-02 PROCEDURE — 85025 COMPLETE CBC W/AUTO DIFF WBC: CPT

## 2025-06-02 PROCEDURE — 83735 ASSAY OF MAGNESIUM: CPT

## 2025-06-02 PROCEDURE — 84484 ASSAY OF TROPONIN QUANT: CPT

## 2025-06-02 PROCEDURE — 85610 PROTHROMBIN TIME: CPT

## 2025-06-05 NOTE — PATIENT PROFILE ADULT - TRANSPORTATION
Occupational Therapy    Patient not seen in therapy.     Per PT, Per chart review and RN, patient is total assist for all mobility/cares at baseline. No acute PT/OT needs. Signing off.           OBJECTIVE                       ASSESSMENT   Triaged Out  - Discipline: OT  - Communication with therapist.           Therapy procedure time and total treatment time can be found documented on the Time Entry flowsheet   no